# Patient Record
(demographics unavailable — no encounter records)

---

## 2024-10-17 NOTE — ASSESSMENT
[FreeTextEntry1] : HIV f/u consult.  HIV stable   10/17/2024 Plan HIV 1. continue current treatment - refills given  2. do blood work  3. f/u 3 months as per pt request  Declined Influenza and Tdap vaccines  1. pt declined Influenza and Tdap vaccines   Fatigue noted with increased fatigue.  Pt was considering Vitamin B12 injections- counselled on SE of injections, benefits, risks.  Pt would like to hold off for now.   1. do blood work  2. start MVI one tab daily   [Treatment Education] : treatment education [Treatment Adherence] : treatment adherence [Rx Dose / Side Effects] : Rx dose/side effects [Nutritional / Food Issues] : nutritional/food issues [Medical Care Issues] : medical care issues [Drug Interactions / Side Effects] : drug interactions/side effects

## 2024-10-17 NOTE — PHYSICAL EXAM
[General Appearance - Alert] : alert [General Appearance - In No Acute Distress] : in no acute distress [General Appearance - Well Nourished] : well nourished [General Appearance - Well-Appearing] : healthy appearing [Sclera] : the sclera and conjunctiva were normal [PERRL With Normal Accommodation] : pupils were equal in size, round, reactive to light [Extraocular Movements] : extraocular movements were intact [Outer Ear] : the ears and nose were normal in appearance [Hearing Threshold Finger Rub Not [place]] : hearing was normal [Examination Of The Oral Cavity] : the lips and gums were normal [Both Tympanic Membranes Were Examined] : both tympanic membranes were normal [Oropharynx] : the oropharynx was normal with no thrush [Neck Appearance] : the appearance of the neck was normal [Neck Cervical Mass (___cm)] : no neck mass was observed [Jugular Venous Distention Increased] : there was no jugular-venous distention [Thyroid Diffuse Enlargement] : the thyroid was not enlarged [Respiration, Rhythm And Depth] : normal respiratory rhythm and effort [Exaggerated Use Of Accessory Muscles For Inspiration] : no accessory muscle use [Auscultation Breath Sounds / Voice Sounds] : lungs were clear to auscultation bilaterally [Heart Rate And Rhythm] : heart rate was normal and rhythm regular [Heart Sounds] : normal S1 and S2 [Heart Sounds Gallop] : no gallops [Murmurs] : no murmurs [Heart Sounds Pericardial Friction Rub] : no pericardial rub [Edema] : there was no peripheral edema [Bowel Sounds] : normal bowel sounds [Abdomen Soft] : soft [Abdomen Tenderness] : non-tender [Costovertebral Angle Tenderness] : no CVA tenderness [No Palpable Adenopathy] : no palpable adenopathy [Musculoskeletal - Swelling] : no joint swelling [Range of Motion to Joints] : range of motion to joints [Nail Clubbing] : no clubbing  or cyanosis of the fingernails [Motor Tone] : muscle strength and tone were normal [Skin Color & Pigmentation] : normal skin color and pigmentation [] : no rash [Skin Lesions] : no skin lesions [Cranial Nerves] : cranial nerves 2-12 were intact [Sensation] : the sensory exam was normal to light touch and pinprick [Motor Exam] : the motor exam was normal [No Focal Deficits] : no focal deficits [Oriented To Time, Place, And Person] : oriented to person, place, and time [Affect] : the affect was normal

## 2024-10-17 NOTE — HISTORY OF PRESENT ILLNESS
complains of pain/discomfort [FreeTextEntry1] : 67 yo male here for annual consult  taking Abacavir, Lamvudine and Tivicay daily with no missed doses or SE  noted with increased fatigue interested in B12 injections   Sexual hx ; denies sexual activity   From previous consult 8/22/2024 ;  67 yo male here for HIV sick consult. taking Abacavir, Lamvudine and Tivicay daily with no missed doses or SE  last week was on vacation and ate at an event shortly after started with nausea and vomiting x 1 episode next morning had abdominal discomfort confirms sore throat and fatigue with symptoms denies taking any medications for this. having increased persistent fatigue but other symptoms resolved noted with elevated glucose since incident home glucose 200s on Free [employer] Ibis system but also not following appropriate diet.    From previous consult 4/16/2024 ; 64 yo male here for HIV f/u consult taking Abacavir, Lamivudine, Tivicay daily with no missed doses or SE  would like new Urology referral  noted with right groin mass pending MRI next week. ordered by PCP  pending ophthalmology consult tomorrow.  pending chest x-ray this week for thick phlegm noted ordered by PCP  has not f/u with rheumatology for elevated ESR and CRP spoke with PCP and was told due to chronic conditions, would have chronic fatigue.   10/17/2024 Plan HIV 1. continue current treatment - refills given  2. do blood work  3. f/u 3 months as per pt request  Declined Influenza and Tdap vaccines  1. pt declined Influenza and Tdap vaccines   Fatigue noted with increased fatigue.  Pt was considering Vitamin B12 injections- counselled on SE of injections, benefits, risks.  Pt would like to hold off for now.   1. do blood work  2. start MVI one tab daily

## 2024-11-27 NOTE — HISTORY OF PRESENT ILLNESS
[FreeTextEntry1] : struggles with G control. has [id number] and also very hyperglycemia. adnoits that he can be more disciplened with suger control also, high salt intake cab food. he stopped meat ED and incontinence, has seen many urologists but did not agree on plan  labs done recently 10/17. reviewed with him.      no fever and no chills. Cardiovascular: the heart rate was not slow, no chest pain and no palpitations. Respiratory: no shortness of breath, no cough and no shortness of breath during exertion. Gastrointestinal: no abdominal pain, no vomiting and no diarrhea. Genitourinary: as per HPI Integumentary: no skin lesions. Neurological: no confusion and no dizziness. Endocrine: + episodes of hypoglycemia. seeing Endo  Constitutional, Eyes, ENT, Cardiovascular, Respiratory, Gastrointestinal, Genitourinary, Neurological and Psychiatric are otherwise negative.

## 2024-11-27 NOTE — ASSESSMENT
[FreeTextEntry1] : 65 yo M with HIV, DKA, admission 6/2017 for LORI due to tenofovir toxicity, had been off HD , HLD, prostate cancer post prostatectomy 3/2019. s/p laser treatments B/L for retinopathy, uncontrolled DM. ED, incontinence with worsening renal function.   had severe AIN, chronic glomerulosclerosis, arteriosclerosis with recovering renal failure.  -CKD 4: not uremic or volume overload. cr 3.0  eGFR 22 10/17/2024. advised to control DM better and decrease salt intake. will have HT nurse reach out to him.   Hyperkalemia: controlled 4.7. low K diet again advised.  --acidosis continues with Bicarbonate  -BP well controlled. no anemia  -MBD-CKD. PTH  -proteinuria- Tp/Cr ratio 0.3 last visit. keep of ACE-I/ARB. refused Farxiga   -f/u optho and endo  -refer to . Dr Mukesh Welch   RTC in 3 months  .

## 2025-01-13 NOTE — HISTORY OF PRESENT ILLNESS
[FreeTextEntry1] : 65 yo male here for HIV f/u consult  taking Tivicay, Lamvudine and Abacavir daily with no missed doses or SE  continues with increased fatigue  taking MVI with minimal results has increased physical activity  pending appt with oncology  needs referral for hx of pelvic floor cancer  needs referral for surgeon for hx of right inguinal hernia   pt f/u with nephrology 11/27/2024 for hx of CKD was recommended to continue same treatment.   next f/u consult 3/3/2025     From previous consult 10/17/2024 :  65 yo male here for annual consult taking Abacavir, Lamvudine and Tivicay daily with no missed doses or SE  noted with increased fatigue interested in B12 injections  Sexual hx ; denies sexual activity   From previous consult 8/22/2024 ; 65 yo male here for HIV sick consult. taking Abacavir, Lamvudine and Tivicay daily with no missed doses or SE  last week was on vacation and ate at an event shortly after started with nausea and vomiting x 1 episode next morning had abdominal discomfort confirms sore throat and fatigue with symptoms denies taking any medications for this. having increased persistent fatigue but other symptoms resolved noted with elevated glucose since incident home glucose 200s on Free [employer] Ibis system but also not following appropriate diet.    From previous consult 4/16/2024 ; 64 yo male here for HIV f/u consult taking Abacavir, Lamivudine, Tivicay daily with no missed doses or SE  would like new Urology referral  noted with right groin mass pending MRI next week. ordered by PCP  pending ophthalmology consult tomorrow.  pending chest x-ray this week for thick phlegm noted ordered by PCP  has not f/u with rheumatology for elevated ESR and CRP spoke with PCP and was told due to chronic conditions, would have chronic fatigue.   1/13/2024 Plan HIV 1. continue current treatment - refills given 2. do blood work 3. f/u 6 months  4. referral to dental given for annual exam.    CKD  pt f/u with Nephrology 11/27/2024 for low GFR and elevated creatine.  Was told to continue current treatment for now.  Next consult 3/3/2025. 1. f/u with Nephrology as scheduled for further management  DM pt needs referral to Podiatry for annual exam 1. referral to podiatry given   Prostate CA  pt has not f/u with oncology for hx of Prostate CA 1. referral to Oncology and Urology given for further management.    Inguinal Hernia  pt with hx of inguinal hernia.   1. referral to surgeon given for further management.   Declined Pneumococcal vaccine  1. pt declined Pneumococcal 20 vaccine    Fatigue  pt noted with continued fatigue.  taking MVI and increased physical activity with minimal results 1. f/u with PCP for further management.

## 2025-01-13 NOTE — PHYSICAL EXAM
[General Appearance - Alert] : alert [General Appearance - In No Acute Distress] : in no acute distress [General Appearance - Well Nourished] : well nourished [General Appearance - Well-Appearing] : healthy appearing [Sclera] : the sclera and conjunctiva were normal [PERRL With Normal Accommodation] : pupils were equal in size, round, reactive to light [Extraocular Movements] : extraocular movements were intact [Outer Ear] : the ears and nose were normal in appearance [Hearing Threshold Finger Rub Not [place]] : hearing was normal [Examination Of The Oral Cavity] : the lips and gums were normal [Both Tympanic Membranes Were Examined] : both tympanic membranes were normal [Oropharynx] : the oropharynx was normal with no thrush [Neck Appearance] : the appearance of the neck was normal [Neck Cervical Mass (___cm)] : no neck mass was observed [Jugular Venous Distention Increased] : there was no jugular-venous distention [Thyroid Diffuse Enlargement] : the thyroid was not enlarged [Respiration, Rhythm And Depth] : normal respiratory rhythm and effort [Exaggerated Use Of Accessory Muscles For Inspiration] : no accessory muscle use [Auscultation Breath Sounds / Voice Sounds] : lungs were clear to auscultation bilaterally [Heart Rate And Rhythm] : heart rate was normal and rhythm regular [Heart Sounds] : normal S1 and S2 [Heart Sounds Gallop] : no gallops [Murmurs] : no murmurs [Heart Sounds Pericardial Friction Rub] : no pericardial rub [Edema] : there was no peripheral edema [Bowel Sounds] : normal bowel sounds [Abdomen Soft] : soft [Abdomen Tenderness] : non-tender [Costovertebral Angle Tenderness] : no CVA tenderness [No Palpable Adenopathy] : no palpable adenopathy [Musculoskeletal - Swelling] : no joint swelling [Range of Motion to Joints] : range of motion to joints [Nail Clubbing] : no clubbing  or cyanosis of the fingernails [Motor Tone] : muscle strength and tone were normal [Skin Color & Pigmentation] : normal skin color and pigmentation [] : no rash [Skin Lesions] : no skin lesions [Cranial Nerves] : cranial nerves 2-12 were intact [Sensation] : the sensory exam was normal to light touch and pinprick [Motor Exam] : the motor exam was normal [No Focal Deficits] : no focal deficits [Oriented To Time, Place, And Person] : oriented to person, place, and time [Affect] : the affect was normal

## 2025-01-13 NOTE — ASSESSMENT
[FreeTextEntry1] : HIV f/u consult.  HIV stable   1/13/2024 Plan HIV 1. continue current treatment - refills given 2. do blood work 3. f/u 6 months  4. referral to dental given for annual exam.    CKD  pt f/u with Nephrology 11/27/2024 for low GFR and elevated creatine.  Was told to continue current treatment for now.  Next consult 3/3/2025. 1. f/u with Nephrology as scheduled for further management  DM pt needs referral to Podiatry for annual exam 1. referral to podiatry given   Prostate CA  pt has not f/u with oncology for hx of Prostate CA 1. referral to Oncology and Urology given for further management.    Inguinal Hernia  pt with hx of inguinal hernia.   1. referral to surgeon given for further management.   Declined Pneumococcal vaccine  1. pt declined Pneumococcal 20 vaccine    Fatigue  pt noted with continued fatigue.  taking MVI and increased physical activity with minimal results 1. f/u with PCP for further management.    [Treatment Education] : treatment education [Treatment Adherence] : treatment adherence [Rx Dose / Side Effects] : Rx dose/side effects [Medical Care Issues] : medical care issues

## 2025-03-04 NOTE — ASSESSMENT
[FreeTextEntry1] : 65 yo M with HIV, DKA, admission 6/2017 for LORI due to tenofovir toxicity, had been off HD , HLD, prostate cancer post prostatectomy 3/2019. s/p laser treatments B/L for retinopathy, uncontrolled DM. ED, incontinence with worsening renal function.  had severe AIN, chronic glomerulosclerosis, arteriosclerosis with recovering renal failure.( off HD )   -CKD 4: not uremic or volume overload. cr 3.0 eGFR 22 10/17/2024---> cr 2.71 eGFR 25  advised to control DM better and decrease salt intake.   HT nurse following   Hyperkalemia: controlled 4.9. low K diet again advised.  --acidosis continues with Bicarbonate  -BP well controlled.  no anemia  -MBD-CKD. PTH 76   -proteinuria- Tp/Cr ratio 0.3 last visit. keep of ACE-I/ARB. refused Farxiga p/cr 1.0   -f/u optho and endo  -refer to . Dr Mukesh Welch- he is following someone else   RTC in 3 months

## 2025-03-04 NOTE — ASSESSMENT
[FreeTextEntry1] : 67 yo M with HIV, DKA, admission 6/2017 for LORI due to tenofovir toxicity, had been off HD , HLD, prostate cancer post prostatectomy 3/2019. s/p laser treatments B/L for retinopathy, uncontrolled DM. ED, incontinence with worsening renal function.  had severe AIN, chronic glomerulosclerosis, arteriosclerosis with recovering renal failure.( off HD )   -CKD 4: not uremic or volume overload. cr 3.0 eGFR 22 10/17/2024---> cr 2.71 eGFR 25  advised to control DM better and decrease salt intake.   HT nurse following   Hyperkalemia: controlled 4.9. low K diet again advised.  --acidosis continues with Bicarbonate  -BP well controlled.  no anemia  -MBD-CKD. PTH 76   -proteinuria- Tp/Cr ratio 0.3 last visit. keep of ACE-I/ARB. refused Farxiga p/cr 1.0   -f/u optho and endo  -refer to . Dr Mukesh Welch- he is following someone else   RTC in 3 months

## 2025-03-04 NOTE — HISTORY OF PRESENT ILLNESS
[FreeTextEntry1] : overall working on controlling sugar and salt intake no edema  energy is better       no fever and no chills. Cardiovascular: the heart rate was not slow, no chest pain and no palpitations. Respiratory: no shortness of breath, no cough and no shortness of breath during exertion. Gastrointestinal: no abdominal pain, no vomiting and no diarrhea. Genitourinary: no dysuria, no urinary hesitancy and no nocturia. Integumentary: no skin lesions. Neurological: no confusion and no dizziness. Endocrine: no episodes of hypoglycemia Constitutional, Eyes, ENT, Cardiovascular, Respiratory, Gastrointestinal, Genitourinary, Neurological and Psychiatric are otherwise negative.

## 2025-06-10 NOTE — HISTORY OF PRESENT ILLNESS
[FreeTextEntry1] : Reason for visit---chronic stable HIV and DM2 (diabetes mellitus, type 2) and Hx of prostate Ca 68 yo male here for HIV f/u consult taking Tivicay, Lamvudine and Abacavir daily with no missed doses or SE continues with increased fatigue taking MVI with minimal results has increased physical activity pending appt with oncologyneeds referral for hx of pelvic floor cancer needs referral for surgeon for hx of right inguinal hernia pt f/u with nephrology 11/27/2024 for hx of CKD was recommended to continue same treatment. next f/u consult 3/3/2025  plan-----chronic stable HIV and DM2 (diabetes mellitus, type 2) and Hx of prostate Ca 1) f/u with Nephrology as scheduled for further management 2) Prostate CA--- referral to Oncology and Urology given for further management. 3) continue taking Abacavir, Lamvudine and Tivicay daily  4) labs today  5) see Yuki in 6 months 6) Debrox given for excessive cerumen bilateral ears   From previous consult 10/17/2024 : 67 yo male here for annual consult taking Abacavir, Lamvudine and Tivicay daily with no missed doses or SE Sexual hx ; denies sexual activity   From previous consult 8/22/2024 ; 67 yo male here for HIV sick consult. taking Abacavir, Lamvudine and Tivicay daily with no missed doses or SE  last week was on vacation and ate at an event shortly after started with nausea and vomiting x 1 episode next morning had abdominal discomfort confirms sore throat and fatigue with symptoms denies taking any medications for this. having increased persistent fatigue but other symptoms resolved noted with elevated glucose since incident home glucose 200s on Free Style [employer]bis system but also not following appropriate diet.    From previous consult 4/16/2024 ; 66 yo male here for HIV f/u consult taking Abacavir, Lamivudine, Tivicay daily with no missed doses or SE  would like new Urology referral  noted with right groin mass pending MRI next week. ordered by PCP  pending ophthalmology consult tomorrow.  pending chest x-ray this week for thick phlegm noted ordered by PCP  has not f/u with rheumatology for elevated ESR and CRP spoke with PCP and was told due to chronic conditions, would have chronic fatigue.     1) f/u with Nephrology as scheduled for further management 2) Prostate CA--- referral to Oncology and Urology given for further management.     Active Problems Acidosis, metabolic (276.2) (E87.20) Anemia (285.9) (D64.9) Anxiety (300.00) (F41.9) Cataract (366.9) (H26.9) Chronic fatigue (780.79) (R53.82) CKD (chronic kidney disease) (585.9) (N18.9) CKD (chronic kidney disease), stage III (585.3) (N18.30) CKD (chronic kidney disease), stage IV (585.4) (N18.4) DM2 (diabetes mellitus, type 2) (250.00) (E11.9) Encounter for immunization (V03.89) (Z23) Erectile dysfunction (607.84) (N52.9) ESR raised (790.1) (R70.0) Fatigue, unspecified type (780.79) (R53.83) Glaucoma (365.9) (H40.9) Hashimoto's thyroiditis (245.2) (E06.3) Herpes zoster vaccination declined (V64.06) (Z28.21) High serum potassium level (276.7) (E87.5) HIV (human immunodeficiency virus infection) (V08) (Z21) HLD (hyperlipidemia) (272.4) (E78.5) Influenza vaccination declined (V64.06) (Z28.21) 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) Mild episode of recurrent major depressive disorder (296.31) (F33.0) Mixed hyperlipidemia (272.2) (E78.2) Multiple thyroid nodules (241.1) (E04.2) Nausea and vomiting, unspecified vomiting type (787.01) (R11.2) Prostate cancer (185) (C61) Pyelonephritis (590.80) (N12) Serum potassium elevated (276.7) (E87.5) Stage 4 chronic kidney disease due to type 2 diabetes mellitus (250.40,585.4) (E11.22,N18.4) Tetanus, diphtheria, and acellular pertussis (Tdap) vaccination declined (V64.06) (Z28.21) Urinary incontinence (788.30) (R32) History of Visit for eye and vision exam (V72.0) (Z01.00)        Past Medical History History of Diabetic ketoacidosis (250.12) (E11.10) History of Diabetic ketoacidosis (250.12) (E11.10) History of dental examination (V15.89) (Z92.89) History of Visit for eye and vision exam (V72.0) (Z01.00)        Current Meds Abacavir Sulfate 300 MG Oral Tablet; TAKE TWO TABLETS BY MOUTH DAILY FreeStyle Ibis 2 [employer] Device; use QID HumaLOG KwikPen 100 UNIT/ML Subcutaneous Solution Pen-injector; INJECT 6 UNIT Before meals lamiVUDine 100 MG Oral Tablet; TAKE 1/2 TABLET BY MOUTH ONCE A DAY Lantus SoloStar 100 UNIT/ML Subcutaneous Solution Pen-injector; INJECT 14 UNIT At Bedtime Latanoprost 0.005 % Ophthalmic Solution Multivitamin Adults 50+ Oral Tablet; TAKE 1 TABLET DAILY Sodium Bicarbonate 650 MG Oral Tablet; TAKE 1 TABLET BY MOUTH TWICE DAILY Timoptic Ocudose 0.5 % Ophthalmic Solution Tivicay 50 MG Oral Tablet; TAKE ONE TABLET BY MOUTH DAILY Vitamin D (Ergocalciferol) 1.25 MG (88623 UT) Oral Capsule; TAKE 1 CAPSULE BY MOUTH WEEKLY        Allergies No Known Drug Allergies

## 2025-06-10 NOTE — ASSESSMENT
[FreeTextEntry1] : plan-----chronic stable HIV and DM2 (diabetes mellitus, type 2) and Hx of prostate Ca 1) f/u with Nephrology as scheduled for further management 2) Prostate CA--- referral to Oncology and Urology given for further management. 3) continue taking Abacavir, Lamvudine and Tivicay daily  4) labs today  5) see Yuki in 6 months 6) Debrox given for excessive cerumen bilateral ears [Treatment Education] : treatment education [Treatment Adherence] : treatment adherence [Drug Interactions / Side Effects] : drug interactions/side effects [HIV Education] : HIV Education [Anticipatory Guidance] : anticipatory guidance